# Patient Record
Sex: FEMALE | Race: BLACK OR AFRICAN AMERICAN | NOT HISPANIC OR LATINO | URBAN - METROPOLITAN AREA
[De-identification: names, ages, dates, MRNs, and addresses within clinical notes are randomized per-mention and may not be internally consistent; named-entity substitution may affect disease eponyms.]

---

## 2017-04-29 ENCOUNTER — EMERGENCY (EMERGENCY)
Facility: HOSPITAL | Age: 42
LOS: 1 days | Discharge: HOME | End: 2017-04-29
Attending: EMERGENCY MEDICINE | Admitting: EMERGENCY MEDICINE
Payer: COMMERCIAL

## 2017-04-29 VITALS
OXYGEN SATURATION: 100 % | SYSTOLIC BLOOD PRESSURE: 132 MMHG | TEMPERATURE: 98 F | DIASTOLIC BLOOD PRESSURE: 77 MMHG | RESPIRATION RATE: 18 BRPM | HEART RATE: 77 BPM

## 2017-04-29 DIAGNOSIS — F10.120 ALCOHOL ABUSE WITH INTOXICATION, UNCOMPLICATED: ICD-10-CM

## 2017-04-29 DIAGNOSIS — R41.82 ALTERED MENTAL STATUS, UNSPECIFIED: ICD-10-CM

## 2017-04-29 PROCEDURE — 99283 EMERGENCY DEPT VISIT LOW MDM: CPT

## 2017-04-29 RX ORDER — ONDANSETRON 8 MG/1
4 TABLET, FILM COATED ORAL ONCE
Qty: 0 | Refills: 0 | Status: COMPLETED | OUTPATIENT
Start: 2017-04-29 | End: 2017-04-29

## 2017-04-29 RX ADMIN — ONDANSETRON 4 MILLIGRAM(S): 8 TABLET, FILM COATED ORAL at 20:20

## 2017-04-29 RX ADMIN — ONDANSETRON 4 MILLIGRAM(S): 8 TABLET, FILM COATED ORAL at 18:27

## 2017-04-29 NOTE — ED PROVIDER NOTE - OBJECTIVE STATEMENT
42 yo F with no known PMHx, BIBA for unsteady gait and nausea s/p work function. Per co-worker, pt had too much alcohol at work and started having nausea and was unsteady.  Denies trauma, fall, HA, dizziness, bleeding, N/V/D/C, CP, SOB, palpitations, tremors, change in urinary/bowel function, and abdominal pain. No illicit drug use noted.

## 2017-04-29 NOTE — ED ADULT TRIAGE NOTE - CHIEF COMPLAINT QUOTE
Accompanied by Co-worker who states pt was drinking . Found in [facility].   Dry heaving.

## 2019-02-14 NOTE — ED PROVIDER NOTE - CARDIAC, MLM
Telephone Encounter by Stephanie Johnsonandrew Desiree [employer] Kristie at 11/13/18 04:13 PM     Author:  Stephanie Venkatesh Desiree Stewart [employer] Kristie Service:  (none) Author Type:  Certified Medical Assistant     Filed:  11/13/18 04:22 PM Encounter Date:  11/13/2018 Status:  Signed     :  Stephanie Riddle Desiree Stewart [employer] Flowers (Certified Medical Assistant)            Received phone call from patient. CBC reordered to [facility] per patient request. Fior Bellamy order to be canceled. [KZ1.1M] Patient notified.[KZ1.2T]       Revision History        User Key Date/Time User Provider Type Action    > KZ1.2 11/13/18 04:22 PM Stephanie Riddle Desiree [email or web address] Kristie Certified Medical Assistant Sign     KZ1.1 11/13/18 04:13 PM Stephanie Riddle Desiree [employer] Certified Medical Assistant     M - Manual, T - Template Normal rate, regular rhythm.  Heart sounds S1, S2.  No murmurs, rubs or gallops.
